# Patient Record
Sex: MALE | Race: WHITE | NOT HISPANIC OR LATINO | Employment: FULL TIME | URBAN - METROPOLITAN AREA
[De-identification: names, ages, dates, MRNs, and addresses within clinical notes are randomized per-mention and may not be internally consistent; named-entity substitution may affect disease eponyms.]

---

## 2018-01-18 ENCOUNTER — COMMUNICATION - HEALTHEAST (OUTPATIENT)
Dept: SCHEDULING | Facility: CLINIC | Age: 33
End: 2018-01-18

## 2018-09-25 ENCOUNTER — OFFICE VISIT - HEALTHEAST (OUTPATIENT)
Dept: FAMILY MEDICINE | Facility: CLINIC | Age: 33
End: 2018-09-25

## 2018-09-25 DIAGNOSIS — H61.20 CERUMEN IMPACTION: ICD-10-CM

## 2018-09-25 DIAGNOSIS — Z76.89 ENCOUNTER TO ESTABLISH CARE: ICD-10-CM

## 2018-10-09 ENCOUNTER — OFFICE VISIT - HEALTHEAST (OUTPATIENT)
Dept: FAMILY MEDICINE | Facility: CLINIC | Age: 33
End: 2018-10-09

## 2018-10-09 DIAGNOSIS — H61.23 BILATERAL IMPACTED CERUMEN: ICD-10-CM

## 2018-10-09 ASSESSMENT — MIFFLIN-ST. JEOR: SCORE: 1801.28

## 2021-06-02 VITALS — WEIGHT: 178 LBS | BODY MASS INDEX: 23.59 KG/M2 | HEIGHT: 73 IN

## 2021-06-02 VITALS — WEIGHT: 178.38 LBS

## 2021-06-20 NOTE — PROGRESS NOTES
"ASSESSMENT:  1. Bilateral impacted cerumen         PLAN:  Cerumen removed by nursing. Hearing improved after visit.  No problem-specific Assessment & Plan notes found for this encounter.      There are no Patient Instructions on file for this visit.    No orders of the defined types were placed in this encounter.    There are no discontinued medications.    No Follow-up on file.    CHIEF COMPLAINT:  Chief Complaint   Patient presents with     Cerumen Impaction       HISTORY OF PRESENT ILLNESS:  Kameron is a 32 y.o. male here today for cerumen impaction.  Done he was requested to follow-up after we are unable to move wax at his previous appointment.  He has been using eardrops for a week and came back today for evaluation and irrigation.  Earwax is moved since using the drops and feels like it is plugging his he is up more.  He is looking forward to getting them cleaned out today.    Gabriel has had trouble with flying on airplanes due to ear pain in the last several years and he travels a lot for work.  He is hoping that getting the wax removal be helpful.  We also discussed other options for treating fluid buildup by the eardrums related to flying and he will follow-up with us related to continuing symptoms.    REVIEW OF SYSTEMS:      Pertinent items are noted in HPI.  All other systems are negative  PFSH:  Reviewed, no changes      TOBACCO USE:  History   Smoking Status     Never Smoker   Smokeless Tobacco     Never Used       VITALS:  Vitals:    10/09/18 1602   BP: (!) 152/92   Patient Site: Left Arm   Patient Position: Sitting   Cuff Size: Adult Regular   Pulse: 82   Resp: 14   Weight: 178 lb (80.7 kg)   Height: 6' 1\" (1.854 m)     Wt Readings from Last 3 Encounters:   10/09/18 178 lb (80.7 kg)   09/25/18 178 lb 6 oz (80.9 kg)       PHYSICAL EXAM:   BP (!) 152/92 (Patient Site: Left Arm, Patient Position: Sitting, Cuff Size: Adult Regular)  Pulse 82  Resp 14  Ht 6' 1\" (1.854 m)  Wt 178 lb (80.7 kg)  BMI 23.48 " kg/m2  General appearance: alert, appears stated age and cooperative  Ears: abnormal external canal right ear - cerumen removed by irrigation and ceruminosis impacting canal and abnormal external canal left ear - cerumen removed by irrigation and ceruminosis impacting canal  Psychologic: Mood and affect normal.      DATA REVIEWED:  Additional History from Old Records Summarized (2): 0  Decision to Obtain Records (1): 0  Radiology Tests Summarized or Ordered (1): 0  Labs Reviewed or Ordered (1): 0  Medicine Test Summarized or Ordered (1): 0  Independent Review of EKG or X-RAY(2 each): 0    The visit lasted a total of 15 minutes face to face with the patient. Over 50% of the time was spent counseling and educating the patient about plan of care, aftercare if pain develops.    MEDICATIONS:  No current outpatient prescriptions on file.     No current facility-administered medications for this visit.        This note has been dictated using voice recognition software. Any grammatical or context distortions are unintentional and inherent to the software

## 2021-06-20 NOTE — PROGRESS NOTES
ASSESSMENT:  1. Encounter to establish care     2. Cerumen impaction         PLAN:  Unable to completely remove cerumen today.  Patient to use drops for a week at home and return for further removal.  That time we can get a good look at his eardrums to see if they are contributing to pain with flying.    Medical history discussed in detail today, patient to return for an annual physical exam at his leisure.  No problem-specific Assessment & Plan notes found for this encounter.      There are no Patient Instructions on file for this visit.    No orders of the defined types were placed in this encounter.    There are no discontinued medications.    No Follow-up on file.    CHIEF COMPLAINT:  Chief Complaint   Patient presents with     Cerumen Impaction     Establish Care       HISTORY OF PRESENT ILLNESS:  Kameron is a 32 y.o. male here today to establish care and discuss ear pain.  Patient has not had a primary doctor for some time, plans to come in for an annual exam here shortly.  He has been getting biometric screenings at work annually and is pretty healthy overall.  Eats a good diet.  No chronic medical problems or chronic medication use.  Patient is , no children.  Works as [employer].  Here today to discuss ear pain.  Patient has been flying more often for work recently, he has pain on descent with every flight that is quite severe.  He thinks he may have a wax buildup in his ears and so ears checked.  Also would like some recommendations as to how to avoid pain on airplane flights in the future.    REVIEW OF SYSTEMS:      Pertinent items are noted in HPI.  All other systems are negative  PFSH:  Reviewed, no changes      TOBACCO USE:  History   Smoking Status     Never Smoker   Smokeless Tobacco     Never Used       VITALS:  Vitals:    09/25/18 1558   Resp: 14   Weight: 178 lb 6 oz (80.9 kg)     Wt Readings from Last 3 Encounters:   09/25/18 178 lb 6 oz (80.9 kg)       PHYSICAL EXAM:   Resp 14  Wt 178 lb  6 oz (80.9 kg)  General appearance: alert, appears stated age and cooperative  Head: Normocephalic, without obvious abnormality, atraumatic  Eyes: conjunctivae/corneas clear. PERRL, EOM's intact. Fundi benign.  Ears: abnormal external canal right ear - ceruminosis impacting canal and abnormal external canal left ear - ceruminosis impacting canal  Nose: Nares normal. Septum midline. Mucosa normal. No drainage or sinus tenderness.  Throat: lips, mucosa, and tongue normal; teeth and gums normal  Neck: no adenopathy, no carotid bruit, no JVD, supple, symmetrical, trachea midline and thyroid not enlarged, symmetric, no tenderness/mass/nodules  Lungs: clear to auscultation bilaterally  Heart: regular rate and rhythm, S1, S2 normal, no murmur, click, rub or gallop  Neurologic: Grossly normal    DATA REVIEWED:  Additional History from Old Records Summarized (2): 0  Decision to Obtain Records (1): 0  Radiology Tests Summarized or Ordered (1): 0  Labs Reviewed or Ordered (1): 0  Medicine Test Summarized or Ordered (1): 0  Independent Review of EKG or X-RAY(2 each): 0    The visit lasted a total of 40 minutes face to face with the patient. Over 50% of the time was spent counseling and educating the patient about plan of care.    MEDICATIONS:  No current outpatient prescriptions on file.     No current facility-administered medications for this visit.        This note has been dictated using voice recognition software. Any grammatical or context distortions are unintentional and inherent to the software

## 2021-08-21 ENCOUNTER — HEALTH MAINTENANCE LETTER (OUTPATIENT)
Age: 36
End: 2021-08-21

## 2021-10-16 ENCOUNTER — HEALTH MAINTENANCE LETTER (OUTPATIENT)
Age: 36
End: 2021-10-16

## 2021-11-11 ENCOUNTER — IMMUNIZATION (OUTPATIENT)
Dept: NURSING | Facility: CLINIC | Age: 36
End: 2021-11-11
Payer: COMMERCIAL

## 2021-11-11 PROCEDURE — 91300 PR COVID VAC PFIZER DIL RECON 30 MCG/0.3 ML IM: CPT

## 2021-11-11 PROCEDURE — 0001A PR COVID VAC PFIZER DIL RECON 30 MCG/0.3 ML IM: CPT

## 2021-12-07 ENCOUNTER — IMMUNIZATION (OUTPATIENT)
Dept: NURSING | Facility: CLINIC | Age: 36
End: 2021-12-07
Attending: PEDIATRICS
Payer: COMMERCIAL

## 2021-12-07 PROCEDURE — 0002A PR COVID VAC PFIZER DIL RECON 30 MCG/0.3 ML IM: CPT

## 2021-12-07 PROCEDURE — 91300 PR COVID VAC PFIZER DIL RECON 30 MCG/0.3 ML IM: CPT

## 2022-10-01 ENCOUNTER — HEALTH MAINTENANCE LETTER (OUTPATIENT)
Age: 37
End: 2022-10-01

## 2023-01-11 ENCOUNTER — OFFICE VISIT (OUTPATIENT)
Dept: FAMILY MEDICINE | Facility: CLINIC | Age: 38
End: 2023-01-11
Payer: COMMERCIAL

## 2023-01-11 VITALS
HEIGHT: 72 IN | DIASTOLIC BLOOD PRESSURE: 83 MMHG | TEMPERATURE: 98.8 F | WEIGHT: 184 LBS | SYSTOLIC BLOOD PRESSURE: 125 MMHG | BODY MASS INDEX: 24.92 KG/M2 | HEART RATE: 88 BPM

## 2023-01-11 DIAGNOSIS — H61.23 BILATERAL IMPACTED CERUMEN: Primary | ICD-10-CM

## 2023-01-11 PROCEDURE — 69209 REMOVE IMPACTED EAR WAX UNI: CPT | Mod: 50 | Performed by: FAMILY MEDICINE

## 2023-01-11 ASSESSMENT — PAIN SCALES - GENERAL: PAINLEVEL: NO PAIN (0)

## 2023-01-11 NOTE — PROGRESS NOTES
Assessment & Plan     Bilateral impacted cerumen  After obtaining verbal consent from the patient.  I did use warm water, lavage irrigation.  Removed a good amount of  both ears, left more than reight      Marycarmen Hnana is a 37 year old presenting for the following health issues:  Cerumen (impacted)  Patient been having pressure fullness in both of his ear.  Left more than right.  He has been using Debrox, and Sudafed.    History of Present Illness       Reason for visit:  Ear pain/ wax buildup  Symptom onset:  More than a month (ongoing for years intermittenly)  Symptom intensity:  Moderate  Symptom progression:  Worsening    He eats 2-3 servings of fruits and vegetables daily.He consumes 0 sweetened beverage(s) daily.He exercises with enough effort to increase his heart rate 30 to 60 minutes per day.  He exercises with enough effort to increase his heart rate 5 days per week.   He is taking medications regularly.     Review of Systems   Constitutional, HEENT, cardiovascular, pulmonary, gi and gu systems are negative, except as otherwise noted.      Objective    There were no vitals taken for this visit.  There is no height or weight on file to calculate BMI.  Physical Exam   GENERAL: healthy, alert and no distress  HENT: normal cephalic/atraumatic and both ears: occluded with wax  S/P : warm water irrigation : both ear canals, normal, normal TM,   PSYCH: mentation appears normal, affect normal/bright        Zina Mooney MD

## 2023-05-22 ASSESSMENT — ENCOUNTER SYMPTOMS
FEVER: 0
FREQUENCY: 0
DIZZINESS: 0
MYALGIAS: 0
NAUSEA: 0
DYSURIA: 0
CONSTIPATION: 0
HEARTBURN: 0
HEMATURIA: 0
JOINT SWELLING: 0
NERVOUS/ANXIOUS: 0
HEADACHES: 0
PALPITATIONS: 0
PARESTHESIAS: 0
DIARRHEA: 0
WEAKNESS: 0
ARTHRALGIAS: 0
SORE THROAT: 0
COUGH: 0
HEMATOCHEZIA: 0
EYE PAIN: 0
ABDOMINAL PAIN: 0
CHILLS: 0
SHORTNESS OF BREATH: 0

## 2023-05-23 ENCOUNTER — OFFICE VISIT (OUTPATIENT)
Dept: FAMILY MEDICINE | Facility: CLINIC | Age: 38
End: 2023-05-23
Payer: COMMERCIAL

## 2023-05-23 VITALS
SYSTOLIC BLOOD PRESSURE: 136 MMHG | HEIGHT: 73 IN | DIASTOLIC BLOOD PRESSURE: 98 MMHG | OXYGEN SATURATION: 97 % | TEMPERATURE: 98.1 F | WEIGHT: 186.25 LBS | HEART RATE: 87 BPM | BODY MASS INDEX: 24.68 KG/M2 | RESPIRATION RATE: 12 BRPM

## 2023-05-23 DIAGNOSIS — R03.0 ELEVATED BLOOD PRESSURE READING WITHOUT DIAGNOSIS OF HYPERTENSION: ICD-10-CM

## 2023-05-23 DIAGNOSIS — Z00.8 ENCOUNTER FOR BIOMETRIC SCREENING: ICD-10-CM

## 2023-05-23 DIAGNOSIS — Z23 IMMUNIZATION DUE: ICD-10-CM

## 2023-05-23 DIAGNOSIS — H69.90 DYSFUNCTION OF EUSTACHIAN TUBE, UNSPECIFIED LATERALITY: ICD-10-CM

## 2023-05-23 DIAGNOSIS — Z00.00 ANNUAL PHYSICAL EXAM: Primary | ICD-10-CM

## 2023-05-23 DIAGNOSIS — L82.1 SEBORRHEIC KERATOSES: ICD-10-CM

## 2023-05-23 LAB
CHOLEST SERPL-MCNC: 213 MG/DL
FASTING STATUS PATIENT QL REPORTED: YES
GLUCOSE SERPL-MCNC: 99 MG/DL (ref 70–99)
HDLC SERPL-MCNC: 38 MG/DL
LDLC SERPL CALC-MCNC: 142 MG/DL
NONHDLC SERPL-MCNC: 175 MG/DL
TRIGL SERPL-MCNC: 165 MG/DL

## 2023-05-23 PROCEDURE — 90715 TDAP VACCINE 7 YRS/> IM: CPT | Performed by: FAMILY MEDICINE

## 2023-05-23 PROCEDURE — 99213 OFFICE O/P EST LOW 20 MIN: CPT | Mod: 25 | Performed by: FAMILY MEDICINE

## 2023-05-23 PROCEDURE — 36415 COLL VENOUS BLD VENIPUNCTURE: CPT | Performed by: FAMILY MEDICINE

## 2023-05-23 PROCEDURE — 90471 IMMUNIZATION ADMIN: CPT | Performed by: FAMILY MEDICINE

## 2023-05-23 PROCEDURE — 82947 ASSAY GLUCOSE BLOOD QUANT: CPT | Performed by: FAMILY MEDICINE

## 2023-05-23 PROCEDURE — 99395 PREV VISIT EST AGE 18-39: CPT | Mod: 25 | Performed by: FAMILY MEDICINE

## 2023-05-23 PROCEDURE — 80061 LIPID PANEL: CPT | Performed by: FAMILY MEDICINE

## 2023-05-23 ASSESSMENT — ENCOUNTER SYMPTOMS
PARESTHESIAS: 0
DIARRHEA: 0
HEARTBURN: 0
JOINT SWELLING: 0
CONSTIPATION: 0
SORE THROAT: 0
NAUSEA: 0
EYE PAIN: 0
CHILLS: 0
FREQUENCY: 0
HEMATOCHEZIA: 0
SHORTNESS OF BREATH: 0
ARTHRALGIAS: 0
WEAKNESS: 0
NERVOUS/ANXIOUS: 0
ABDOMINAL PAIN: 0
HEADACHES: 0
FEVER: 0
HEMATURIA: 0
MYALGIAS: 0
COUGH: 0
PALPITATIONS: 0
DIZZINESS: 0
DYSURIA: 0

## 2023-05-23 NOTE — PATIENT INSTRUCTIONS
Trial Flonase intranasal steroid spray for a week or so before travel.  Okay to use Afrin on travel days (or Sudafed).  Don't mix with the Claritin-D (decongestant version).     Preventive Health Recommendations  Male Ages 26 - 39    Yearly exam:             See your health care provider every year in order to  o   Review health changes.   o   Discuss preventive care.    o   Review your medicines if your doctor has prescribed any.  You should be tested each year for STDs (sexually transmitted diseases), if you re at risk.   After age 35, talk to your provider about cholesterol testing. If you are at risk for heart disease, have your cholesterol tested at least every 5 years.   If you are at risk for diabetes, you should have a diabetes test (fasting glucose).  Shots: Get a flu shot each year. Get a tetanus shot every 10 years.     Nutrition:  Eat at least 5 servings of fruits and vegetables daily.   Eat whole-grain bread, whole-wheat pasta and brown rice instead of white grains and rice.   Get adequate Calcium and Vitamin D.     Lifestyle  Exercise for at least 150 minutes a week (30 minutes a day, 5 days a week). This will help you control your weight and prevent disease.   Limit alcohol to one drink per day.   No smoking.   Wear sunscreen to prevent skin cancer.   See your dentist every six months for an exam and cleaning.

## 2023-05-23 NOTE — PROGRESS NOTES
SUBJECTIVE:   CC: Jacques is an 37 year old who presents for preventative health visit.     Chief Complaints and History of Present Illnesses   Patient presents with     Physical     Establish Care     Mole     Dermatology referral?     Ear Problem     Ear pain when flying.            5/23/2023    12:51 PM   Additional Questions   Roomed by Vicky HART CMA   Accompanied by Self         5/23/2023    12:56 PM   Patient Reported Additional Medications   Patient reports taking the following new medications Claritin D   Patient has been advised of split billing requirements and indicates understanding: Yes  Healthy Habits:    Getting at least 3 servings of Calcium per day:  Yes    Bi-annual eye exam:  NO    Dental care twice a year:  Yes    Sleep apnea or symptoms of sleep apnea:  None    Diet:  Regular (no restrictions)    Frequency of exercise:  4-5 days/week    Duration of exercise:  15-30 minutes    Taking medications regularly:  Yes    Medication side effects:  Not applicable    PHQ-2 Total Score:    Additional concerns today:  Yes    BLOOD PRESSURE: Taking Claritin-D, gearing up allergy season - grass pollen and cottonwood trees.    BP Readings from Last 6 Encounters:   05/23/23 (!) 136/99   01/11/23 125/83     Does have a home cuff, checks periodically.     BIOMETRIC SCREENING: Needs form for work completed.     MOLE: History of moles, developing more with age. Not bothersome but wants to have them checked. Previous tanning beds and sun exposure. No skin cancer in family.       EAR PAIN w FLYING: Flying more often, bot work and fun. Was told to dry Sudafed. If times right, is helpful. Also develops wax buildup in ears, helps when cleaned.  Tries to time it right.     Have you ever done Advance Care Planning? (For example, a Health Directive, POLST, or a discussion with a medical provider or your loved ones about your wishes): No, advance care planning information given to patient to review.  Advanced care planning  "was discussed at today's visit.    Social History     Tobacco Use     Smoking status: Never     Passive exposure: Never     Smokeless tobacco: Never   Vaping Use     Vaping status: Never Used   Substance Use Topics     Alcohol use: Not on file           5/22/2023     6:50 PM   Alcohol Use   Prescreen: >3 drinks/day or >7 drinks/week? No       Last PSA: No results found for: PSA    Reviewed orders with patient. Reviewed health maintenance and updated orders accordingly - Yes  - HIV: declines risk factors    - Hep C: declines risk factors    - Hep B: will check records    - TDaP: will do today    - covid: declines      Reviewed and updated as needed this visit by clinical staff   Tobacco  Allergies  Meds              Reviewed and updated as needed this visit by Provider   Tobacco  Allergies  Meds  Problems  Med Hx  Surg Hx  Fam Hx  Soc   Hx        Review of Systems   Constitutional: Negative for chills and fever.   HENT: Negative for congestion, ear pain, hearing loss and sore throat.    Eyes: Negative for pain and visual disturbance.   Respiratory: Negative for cough and shortness of breath.    Cardiovascular: Negative for chest pain, palpitations and peripheral edema.   Gastrointestinal: Negative for abdominal pain, constipation, diarrhea, heartburn, hematochezia and nausea.   Genitourinary: Negative for dysuria, frequency, genital sores, hematuria, impotence, penile discharge and urgency.   Musculoskeletal: Negative for arthralgias, joint swelling and myalgias.   Skin: Negative for rash.   Neurological: Negative for dizziness, weakness, headaches and paresthesias.   Psychiatric/Behavioral: Negative for mood changes. The patient is not nervous/anxious.        OBJECTIVE:   BP (!) 136/99 (BP Location: Left arm, Patient Position: Sitting, Cuff Size: Adult Regular)   Pulse 91   Temp 98.1  F (36.7  C) (Oral)   Resp 12   Ht 1.842 m (6' 0.5\")   Wt 84.5 kg (186 lb 4 oz)   SpO2 97%   BMI 24.91 kg/m  "     Physical Exam  GENERAL: healthy, alert and no distress  EYES: Eyes grossly normal to inspection, PERRL and conjunctivae and sclerae normal  HENT: ear canals and TM's normal, nose and mouth without ulcers or lesions  NECK: no adenopathy, no asymmetry, masses, or scars and thyroid normal to palpation  RESP: lungs clear to auscultation - no rales, rhonchi or wheezes  CV: regular rate and rhythm, normal S1 S2, no S3 or S4, no murmur, click or rub, no peripheral edema and peripheral pulses strong  ABDOMEN: soft, nontender, no hepatosplenomegaly, no masses and bowel sounds normal  MS: no gross musculoskeletal defects noted, no edema  SKIN: no suspicious lesions or rashes.  He has a few benign-appearing nevi.  Lesion under right chest is stuck on and slightly rough consistent with seborrheic keratosis.  Lesion mid back also rough, flesh-colored, consistent with seborrheic keratosis.  NEURO: Normal strength and tone, mentation intact and speech normal  PSYCH: mentation appears normal, affect normal/bright      ASSESSMENT/PLAN:     1. Annual physical exam  - REVIEW OF HEALTH MAINTENANCE PROTOCOL ORDERS    Reviewed health history and health maintenance recommendations.    2. Encounter for biometric screening  - Lipid panel reflex to direct LDL Non-fasting; Future  - Glucose; Future    We will fax form once completed.  We will update patient by [facility] with lab results.    3. Elevated blood pressure reading without diagnosis of hypertension  Blood pressure minimally elevated today.  Is taking Claritin-D.  He has a home blood pressure cuff and will start checking more regularly.  We will reach out to patient in a week for update on home blood pressure readings    4. Dysfunction of Eustachian tube, unspecified laterality  Symptomatic when flying.  Recommend Flonase more consistently prior to scheduled travel.  Could take Sudafed on day of flight OR 1 dose of Afrin so long as blood pressure levels remain normal.    5.  Seborrheic keratoses  Skin lesions consistent with seborrheic keratoses.  Could proceed with cryotherapy if bothersome.  He would like to continue to monitor.  Denies any concerning symptoms at this time.    6. Immunization due  - TDAP VACCINE (Adacel, Boostrix)      Declines COVID booster.  Reviewed hepatitis B recommendations.  Check home records.  Provided educational handout on hepatitis B vaccination.    Patient has been advised of split billing requirements and indicates understanding: Yes      COUNSELING:   Reviewed preventive health counseling, as reflected in patient instructions        He reports that he has never smoked. He has never been exposed to tobacco smoke. He has never used smokeless tobacco.        Mariam Huynh, [facility]    Prior to immunization administration, verified patients identity using patient s name and date of birth. Please see Immunization Activity for additional information.     Screening Questionnaire for Adult Immunization    Are you sick today?   No   Do you have allergies to medications, food, a vaccine component or latex?   No   Have you ever had a serious reaction after receiving a vaccination?   No   Do you have a long-term health problem with heart, lung, kidney, or metabolic disease (e.g., diabetes), asthma, a blood disorder, no spleen, complement component deficiency, a cochlear implant, or a spinal fluid leak?  Are you on long-term aspirin therapy?   No   Do you have cancer, leukemia, HIV/AIDS, or any other immune system problem?   No   Do you have a parent, brother, or sister with an immune system problem?   No   In the past 3 months, have you taken medications that affect  your immune system, such as prednisone, other steroids, or anticancer drugs; drugs for the treatment of rheumatoid arthritis, Crohn s disease, or psoriasis; or have you had radiation treatments?   No   Have you had a seizure, or a brain or other nervous system  problem?   No   During the past year, have you received a transfusion of blood or blood    products, or been given immune (gamma) globulin or antiviral drug?   No   For women: Are you pregnant or is there a chance you could become       pregnant during the next month?   No   Have you received any vaccinations in the past 4 weeks?   No     Immunization questionnaire answers were all negative.      Injection of   Immunizations Administered     Name Date Dose VIS Date Route    TDAP (Adacel,Boostrix) 5/23/23  1:33 PM 0.5 mL 08/06/2021, Given Today Intramuscular         given by Vicky Goodson CMA. Patient instructed to remain in clinic for 15 minutes afterwards, and to report any adverse reactions.     Screening performed by Vicky Goodson CMA on 5/23/2023 at 1:40 PM.

## 2023-05-24 NOTE — RESULT ENCOUNTER NOTE
Patient updated by [employer] message with lab results.      Aris Hanna,  Labs have returned.  Cholesterol labs are a little elevated.  Please continue working on regular physical activity as well as a heart healthy nutrient dense diet.  Reach out by [employer] with any follow-up questions or concerns.  Mariam Huynh, DO

## 2023-05-30 ENCOUNTER — TELEPHONE (OUTPATIENT)
Dept: FAMILY MEDICINE | Facility: CLINIC | Age: 38
End: 2023-05-30
Payer: COMMERCIAL

## 2023-05-30 NOTE — TELEPHONE ENCOUNTER
----- Message from Mariam Huynh, DO sent at 5/23/2023  1:31 PM CDT -----  Please check with Jacques, home reported blood pressure readings since visit?  Mariam Huynh, DO

## 2023-05-30 NOTE — TELEPHONE ENCOUNTER
Called and spoke to pt.    Pt checked his blood pressure twice after his appt on 23.    1st readin23 PM was 123/92  2nd readin23 PM was 126/90    Pt said that he was still on the Claritin D when he took those blood pressures. He plans on changing to just plan Claritin or a different medication this week and checking his blood pressure again. I let pt know that he could send a [employer] message with his readings otherwise we will give him a call back to get an update.

## 2024-01-16 ASSESSMENT — ENCOUNTER SYMPTOMS
HEMATOCHEZIA: 0
SHORTNESS OF BREATH: 0
PALPITATIONS: 0
DYSURIA: 0
COUGH: 0
FREQUENCY: 0
DIZZINESS: 0
SORE THROAT: 0
NAUSEA: 0
ARTHRALGIAS: 0
JOINT SWELLING: 0
WEAKNESS: 0
ABDOMINAL PAIN: 0
CHILLS: 0
NERVOUS/ANXIOUS: 0
CONSTIPATION: 0
FEVER: 0
MYALGIAS: 0
PARESTHESIAS: 0
DIARRHEA: 0
HEMATURIA: 0
HEADACHES: 0
EYE PAIN: 0
HEARTBURN: 0

## 2024-01-19 ENCOUNTER — OFFICE VISIT (OUTPATIENT)
Dept: FAMILY MEDICINE | Facility: CLINIC | Age: 39
End: 2024-01-19
Payer: COMMERCIAL

## 2024-01-19 VITALS
HEART RATE: 89 BPM | OXYGEN SATURATION: 98 % | WEIGHT: 187 LBS | RESPIRATION RATE: 16 BRPM | BODY MASS INDEX: 25.33 KG/M2 | SYSTOLIC BLOOD PRESSURE: 136 MMHG | TEMPERATURE: 98.1 F | HEIGHT: 72 IN | DIASTOLIC BLOOD PRESSURE: 91 MMHG

## 2024-01-19 DIAGNOSIS — Z00.00 ANNUAL PHYSICAL EXAM: Primary | ICD-10-CM

## 2024-01-19 DIAGNOSIS — Z23 IMMUNIZATION DUE: ICD-10-CM

## 2024-01-19 DIAGNOSIS — Z13.220 LIPID SCREENING: ICD-10-CM

## 2024-01-19 DIAGNOSIS — R03.0 ELEVATED BLOOD PRESSURE READING WITHOUT DIAGNOSIS OF HYPERTENSION: ICD-10-CM

## 2024-01-19 DIAGNOSIS — Z11.59 NEED FOR HEPATITIS C SCREENING TEST: ICD-10-CM

## 2024-01-19 DIAGNOSIS — Z13.1 SCREENING FOR DIABETES MELLITUS: ICD-10-CM

## 2024-01-19 DIAGNOSIS — Z11.4 SCREENING FOR HIV (HUMAN IMMUNODEFICIENCY VIRUS): ICD-10-CM

## 2024-01-19 DIAGNOSIS — Z00.8 ENCOUNTER FOR BIOMETRIC SCREENING: ICD-10-CM

## 2024-01-19 PROBLEM — H69.90 DYSFUNCTION OF EUSTACHIAN TUBE, UNSPECIFIED LATERALITY: Status: RESOLVED | Noted: 2023-05-23 | Resolved: 2024-01-19

## 2024-01-19 PROCEDURE — 87389 HIV-1 AG W/HIV-1&-2 AB AG IA: CPT | Performed by: FAMILY MEDICINE

## 2024-01-19 PROCEDURE — 99395 PREV VISIT EST AGE 18-39: CPT | Performed by: FAMILY MEDICINE

## 2024-01-19 PROCEDURE — 82947 ASSAY GLUCOSE BLOOD QUANT: CPT | Performed by: FAMILY MEDICINE

## 2024-01-19 PROCEDURE — 86706 HEP B SURFACE ANTIBODY: CPT | Performed by: FAMILY MEDICINE

## 2024-01-19 PROCEDURE — 80061 LIPID PANEL: CPT | Performed by: FAMILY MEDICINE

## 2024-01-19 PROCEDURE — 86803 HEPATITIS C AB TEST: CPT | Performed by: FAMILY MEDICINE

## 2024-01-19 PROCEDURE — 36415 COLL VENOUS BLD VENIPUNCTURE: CPT | Performed by: FAMILY MEDICINE

## 2024-01-19 ASSESSMENT — ENCOUNTER SYMPTOMS
CHILLS: 0
NAUSEA: 0
NERVOUS/ANXIOUS: 0
JOINT SWELLING: 0
HEADACHES: 0
ABDOMINAL PAIN: 0
EYE PAIN: 0
MYALGIAS: 0
PALPITATIONS: 0
CONSTIPATION: 0
FEVER: 0
FREQUENCY: 0
DIZZINESS: 0
SORE THROAT: 0
SHORTNESS OF BREATH: 0
WEAKNESS: 0
HEMATURIA: 0
COUGH: 0
DIARRHEA: 0
ARTHRALGIAS: 0
DYSURIA: 0

## 2024-01-19 NOTE — PROGRESS NOTES
"Preventive Care Visit  [facility]  Mariam Huynh DO, Family Medicine  Jan 19, 2024      SUBJECTIVE:   Jacques is a 38 year old, presenting for the following:  Physical (Fasting for 8 hours.)    Chief Complaints and History of Present Illnesses   Patient presents with    Physical     Fasting for 8 hours.    Mole     Derm referral            1/19/2024     2:02 PM   Additional Questions   Roomed by Vicky HART CMA   Accompanied by Self     Healthy Habits:     Getting at least 3 servings of Calcium per day:  Yes    Bi-annual eye exam:  NO    Dental care twice a year:  Yes    Sleep apnea or symptoms of sleep apnea:  None    Diet:  Regular (no restrictions)    Frequency of exercise:  6-7 days/week    Duration of exercise:  30-45 minutes    Taking medications regularly:  Yes    Medication side effects:  Not applicable    Additional concerns today:  No      Today's PHQ-2 Score:       1/19/2024     1:40 PM   PHQ-2 ( 1999 [employer])   Q1: Little interest or pleasure in doing things 0   Q2: Feeling down, depressed or hopeless 0   PHQ-2 Score 0   Q1: Little interest or pleasure in doing things Not at all   Q2: Feeling down, depressed or hopeless Not at all   PHQ-2 Score 0       SK: would like to do a formal derm skin check.       BLOOD PRESSURE: Checks outside of the clinic. 120's / upper 70's.        DYSFUNCTION EUSTACHIAN TUBES: resolved        HEALTH MAINTENANCE:   - HIV: agreeable to screen    - Hep C: agreeable to screen    - Hep B: will check immunity    - Flu: declines    - Covid: declines        Social History     Tobacco Use    Smoking status: Never     Passive exposure: Never    Smokeless tobacco: Never   Substance Use Topics    Alcohol use: Not on file             1/16/2024    12:45 PM   Alcohol Use   Prescreen: >3 drinks/day or >7 drinks/week? No       Last PSA: No results found for: \"PSA\"    Reviewed orders with patient. Reviewed health maintenance and updated orders accordingly - " "Yes      Reviewed and updated as needed this visit by clinical staff   Tobacco  Allergies  Meds              Reviewed and updated as needed this visit by Provider   Tobacco  Allergies  Meds  Problems  Med Hx  Surg Hx  Fam Hx            Review of Systems   Constitutional:  Negative for chills and fever.   HENT:  Negative for congestion, ear pain, hearing loss and sore throat.    Eyes:  Negative for pain and visual disturbance.   Respiratory:  Negative for cough and shortness of breath.    Cardiovascular:  Negative for chest pain and palpitations.   Gastrointestinal:  Negative for abdominal pain, constipation, diarrhea and nausea.   Genitourinary:  Negative for dysuria, frequency, genital sores, hematuria, penile discharge and urgency.   Musculoskeletal:  Negative for arthralgias, joint swelling and myalgias.   Skin:  Negative for rash.   Neurological:  Negative for dizziness, weakness and headaches.   Psychiatric/Behavioral:  The patient is not nervous/anxious.          OBJECTIVE:   BP (!) 140/95 (BP Location: Left arm, Patient Position: Sitting, Cuff Size: Adult Regular)   Pulse 86   Temp 98.1  F (36.7  C) (Oral)   Resp 16   Ht 1.835 m (6' 0.25\")   Wt 84.8 kg (187 lb)   SpO2 98%   BMI 25.19 kg/m     Estimated body mass index is 25.19 kg/m  as calculated from the following:    Height as of this encounter: 1.835 m (6' 0.25\").    Weight as of this encounter: 84.8 kg (187 lb).  Physical Exam  GENERAL: alert and no distress  EYES: Eyes grossly normal to inspection, PERRL and conjunctivae and sclerae normal  HENT: ear canals and TM's normal, nose and mouth without ulcers or lesions  NECK: no adenopathy, no asymmetry, masses, or scars  RESP: lungs clear to auscultation - no rales, rhonchi or wheezes  CV: regular rate and rhythm, normal S1 S2, no S3 or S4, no murmur, click or rub, no peripheral edema  ABDOMEN: soft, nontender, no hepatosplenomegaly, no masses and bowel sounds normal  MS: no gross " "musculoskeletal defects noted, no edema  SKIN: no suspicious lesions or rashes  NEURO: Normal strength and tone, mentation intact and speech normal  PSYCH: mentation appears normal, affect normal/bright  LYMPH: no cervical, supraclavicular, axillary, or inguinal adenopathy        ASSESSMENT/PLAN:     1. Annual physical exam  - Adult Dermatology  Referral; Future    Reviewed health history and health maintenance recommendations.  Working hard on regular physical activity and healthy diet. Declines flu or COVID shots today.  Interested in dermatology referral for routine skin check.  Referral placed.    2. Encounter for biometric screening  Due for biometric screening.  Will complete form when he returns to clinic.    3. Elevated blood pressure reading without diagnosis of hypertension  Blood pressure borderline in clinic, he states it is adequately controlled at home.  We will reach out to patient in a week for update on home blood pressure readings.    4. Need for hepatitis C screening test  - Hepatitis C Screen Reflex to HCV RNA Quant and Genotype    Agreeable to routine screening.    5. Screening for HIV (human immunodeficiency virus)  - HIV Antigen Antibody Combo    Agreeable to routine screening.    6. Lipid screening  - Lipid panel reflex to direct LDL Fasting    7. Screening for diabetes mellitus  - Glucose    8. Immunization due  - Hepatitis B Surface Antibody     Unsure if he has been vaccinated, would like to check antibodies.    Patient has been advised of split billing requirements and indicates understanding: Yes      Counseling  Reviewed preventive health counseling, as reflected in patient instructions      BMI  Estimated body mass index is 25.19 kg/m  as calculated from the following:    Height as of this encounter: 1.835 m (6' 0.25\").    Weight as of this encounter: 84.8 kg (187 lb).   Weight management plan: Discussed healthy diet and exercise guidelines      He reports that he has never " smoked. He has never been exposed to tobacco smoke. He has never used smokeless tobacco.            Signed Electronically by: Mariam Huynh DO

## 2024-01-20 LAB
CHOLEST SERPL-MCNC: 236 MG/DL
FASTING STATUS PATIENT QL REPORTED: YES
FASTING STATUS PATIENT QL REPORTED: YES
GLUCOSE SERPL-MCNC: 101 MG/DL (ref 70–99)
HBV SURFACE AB SERPL IA-ACNC: <3.5 M[IU]/ML
HBV SURFACE AB SERPL IA-ACNC: NONREACTIVE M[IU]/ML
HCV AB SERPL QL IA: NONREACTIVE
HDLC SERPL-MCNC: 38 MG/DL
HIV 1+2 AB+HIV1 P24 AG SERPL QL IA: NONREACTIVE
LDLC SERPL CALC-MCNC: 171 MG/DL
NONHDLC SERPL-MCNC: 198 MG/DL
TRIGL SERPL-MCNC: 133 MG/DL

## 2024-01-23 ENCOUNTER — MYC MEDICAL ADVICE (OUTPATIENT)
Dept: FAMILY MEDICINE | Facility: CLINIC | Age: 39
End: 2024-01-23
Payer: COMMERCIAL

## 2024-01-23 DIAGNOSIS — E78.00 HYPERCHOLESTEROLEMIA: Primary | ICD-10-CM

## 2024-01-23 NOTE — RESULT ENCOUNTER NOTE
The ASCVD Risk score (Michelle DK, et al., 2019) failed to calculate for the following reasons:    The 2019 ASCVD risk score is only valid for ages 40 to 79   Patient updated by [employer] message with lab results.       Aris Hanna,  Thanks again for coming into the clinic. It was nice to see you. Your labs have returned.   1. Your cholesterol is up a bit from last check. Keep working on a heart healthy and nutrient dense diet, regular physical activity, and weight management to help control cholesterol numbers.   2. Your are not immune to hepatitis B and can consider immunization with the hepatitis B vaccine series.  3. Remaining tests look good.  Please reach out by [employer] message with follow up questions or concerns.  Mariam Huynh, DO

## 2024-12-30 ENCOUNTER — E-VISIT (OUTPATIENT)
Dept: URGENT CARE | Facility: CLINIC | Age: 39
End: 2024-12-30
Payer: COMMERCIAL

## 2024-12-30 DIAGNOSIS — J01.90 ACUTE SINUSITIS WITH SYMPTOMS > 10 DAYS: Primary | ICD-10-CM

## 2024-12-30 NOTE — PATIENT INSTRUCTIONS
Acute Sinusitis: Care Instructions  Overview     Acute sinusitis is an inflammation of the mucous membranes inside the nose and sinuses. Sinuses are the hollow spaces in your skull around the eyes and nose. Acute sinusitis often follows a cold. Acute sinusitis causes thick, discolored mucus that drains from the nose or down the back of the throat. It also can cause pain and pressure in your head and face along with a stuffy or blocked nose.  In most cases, sinusitis gets better on its own in 1 to 2 weeks. But some mild symptoms may last for several weeks. Sometimes antibiotics are needed if there is a bacterial infection.  Follow-up care is a key part of your treatment and safety. Be sure to make and go to all appointments, and call your doctor if you are having problems. It's also a good idea to know your test results and keep a list of the medicines you take.  How can you care for yourself at home?  Use saline (saltwater) nasal washes. This can help keep your nasal passages open and wash out mucus and allergens.  You can buy saline nose washes at a grocery store or drugstore. Follow the instructions on the package.  You can make your own at home. Add 1 teaspoon of non-iodized salt and 1 teaspoon of baking soda to 2 cups of distilled or boiled and cooled water. Fill a squeeze bottle or a nasal cleansing pot (such as a neti pot) with the nasal wash. Then put the tip into your nostril, and lean over the sink. With your mouth open, gently squirt the liquid. Repeat on the other side.  Try a decongestant nasal spray like oxymetazoline (Afrin). Do not use it for more than 3 days in a row. Using it for more than 3 days can make your congestion worse.  If needed, take an over-the-counter pain medicine, such as acetaminophen (Tylenol), ibuprofen (Advil, Motrin), or naproxen (Aleve). Read and follow all instructions on the label.  If the doctor prescribed antibiotics, take them as directed. Do not stop taking them just  "because you feel better. You need to take the full course of antibiotics.  Be careful when taking over-the-counter cold or flu medicines and Tylenol at the same time. Many of these medicines have acetaminophen, which is Tylenol. Read the labels to make sure that you are not taking more than the recommended dose. Too much acetaminophen (Tylenol) can be harmful.  Try a steroid nasal spray. It may help with your symptoms.  Breathe warm, moist air. You can use a steamy shower, a hot bath, or a sink filled with hot water. Avoid cold, dry air. Using a humidifier in your home may help. Follow the directions for cleaning the machine.  When should you call for help?   Call your doctor now or seek immediate medical care if:    You have new or worse swelling, redness, or pain in your face or around one or both of your eyes.     You have double vision or a change in your vision.     You have a high fever.     You have a severe headache and a stiff neck.     You have mental changes, such as feeling confused or much less alert.   Watch closely for changes in your health, and be sure to contact your doctor if:    You are not getting better as expected.   Where can you learn more?  Go to https://www.[employer].net/patiented  Enter I933 in the search box to learn more about \"Acute Sinusitis: Care Instructions.\"  Current as of: September 27, 2023  Content Version: 14.3    2024 [employer].   Care instructions adapted under license by your healthcare professional. If you have questions about a medical condition or this instruction, always ask your healthcare professional. [employer] disclaims any warranty or liability for your use of this information.    Thank you for choosing us for your care. I have placed an order for a prescription antibiotic  so that you can start treatment. View your full visit summary for details by clicking on the link below. Your pharmacist will able to address any questions you may " have about the medication.     Augmentin is prescribed which is a strong antibiotic recommended for sinus infections.  Strong antibiotics can make people prone to antibiotic associated diarrhea - one of which is Clostridium Difficile.  If you develop diarrhea- please stop the antibiotic and consult with your primary care provider    If you're not feeling better within 5-7 days, please schedule an appointment.  You can schedule an appointment right here in [facility], or call [phone]  If the visit is for the same symptoms as your eVisit, we'll refund the cost of your eVisit if seen within seven days.

## 2025-03-15 ENCOUNTER — HEALTH MAINTENANCE LETTER (OUTPATIENT)
Age: 40
End: 2025-03-15

## 2025-07-08 ENCOUNTER — MYC MEDICAL ADVICE (OUTPATIENT)
Dept: FAMILY MEDICINE | Facility: CLINIC | Age: 40
End: 2025-07-08
Payer: COMMERCIAL